# Patient Record
Sex: MALE
[De-identification: names, ages, dates, MRNs, and addresses within clinical notes are randomized per-mention and may not be internally consistent; named-entity substitution may affect disease eponyms.]

---

## 2023-08-16 ENCOUNTER — DASHBOARD ENCOUNTERS (OUTPATIENT)
Age: 48
End: 2023-08-16

## 2023-08-16 ENCOUNTER — OFFICE VISIT (OUTPATIENT)
Dept: URBAN - METROPOLITAN AREA TELEHEALTH 2 | Facility: TELEHEALTH | Age: 48
End: 2023-08-16
Payer: COMMERCIAL

## 2023-08-16 DIAGNOSIS — R10.84 ABDOMINAL CRAMPING, GENERALIZED: ICD-10-CM

## 2023-08-16 DIAGNOSIS — R11.0 NAUSEA: ICD-10-CM

## 2023-08-16 DIAGNOSIS — R19.7 ACUTE DIARRHEA: ICD-10-CM

## 2023-08-16 DIAGNOSIS — Z86.19 HISTORY OF GIARDIA INFECTION: ICD-10-CM

## 2023-08-16 PROCEDURE — 99204 OFFICE O/P NEW MOD 45 MIN: CPT | Performed by: INTERNAL MEDICINE

## 2023-08-16 RX ORDER — CHOLESTYRAMINE 4 G/9G
1 SCOOP POWDER, FOR SUSPENSION ORAL ONCE A DAY
Qty: 30 | OUTPATIENT
Start: 2023-08-16

## 2023-08-16 NOTE — HPI-TODAY'S VISIT:
Mr. Pelaez 48y M presents today w/ complaints of diarrhea, he states he has had intermittent loose stools since 2018, he has a hx of giardia which he was treated for in 2018 and then resurfaced again in fall of 2021, patient states that over the last 5 days, he has developed a low FODMAP diet which has significantly decreased his abdominal cramping, however, he continues to have diarrhea and nausea  (+) Chronic diarrhea  - Patient has had 3 colonoscopies, the last in 2021, and advised to f/u again in 10 years  - Last stool test was in February 23', which was normal  - Patient is currently not taking any medications for his diarrhea  - Patient notes occasional hard stools maybe once a week, patient notes he has complete evacuation most days, but occasionally will experience incomplete evacuation  - On a normal day patient will have 1-2 BM's a day, on a bad day he will have 6-7 BM's a day, patient states this new bout of diarrhea is very watery and then other days he will have mushier stools  - Patient notes mucus in his stool a few days a week based on how many movements he has had that day  - Coffee will usually exacerbate his symptoms and cause him to have more BM's  - Patient has eliminated dairy from his diet and notes good improvement in his abdominal cramping pain  - Patient notes occasionally he will have mild rectal bleeding after wiping continuously and only occurs rarely

## 2023-08-22 ENCOUNTER — TELEPHONE ENCOUNTER (OUTPATIENT)
Dept: URBAN - METROPOLITAN AREA CLINIC 25 | Facility: CLINIC | Age: 48
End: 2023-08-22

## 2023-08-28 ENCOUNTER — WEB ENCOUNTER (OUTPATIENT)
Dept: URBAN - METROPOLITAN AREA CLINIC 25 | Facility: CLINIC | Age: 48
End: 2023-08-28

## 2023-09-01 ENCOUNTER — WEB ENCOUNTER (OUTPATIENT)
Dept: URBAN - METROPOLITAN AREA CLINIC 25 | Facility: CLINIC | Age: 48
End: 2023-09-01

## 2023-09-06 ENCOUNTER — WEB ENCOUNTER (OUTPATIENT)
Dept: URBAN - METROPOLITAN AREA CLINIC 25 | Facility: CLINIC | Age: 48
End: 2023-09-06

## 2023-10-15 ENCOUNTER — WEB ENCOUNTER (OUTPATIENT)
Dept: URBAN - METROPOLITAN AREA CLINIC 25 | Facility: CLINIC | Age: 48
End: 2023-10-15

## 2023-10-20 ENCOUNTER — WEB ENCOUNTER (OUTPATIENT)
Dept: URBAN - METROPOLITAN AREA CLINIC 25 | Facility: CLINIC | Age: 48
End: 2023-10-20

## 2023-10-20 ENCOUNTER — LAB OUTSIDE AN ENCOUNTER (OUTPATIENT)
Dept: URBAN - METROPOLITAN AREA CLINIC 25 | Facility: CLINIC | Age: 48
End: 2023-10-20

## 2023-10-24 ENCOUNTER — WEB ENCOUNTER (OUTPATIENT)
Dept: URBAN - METROPOLITAN AREA CLINIC 25 | Facility: CLINIC | Age: 48
End: 2023-10-24

## 2023-10-25 ENCOUNTER — WEB ENCOUNTER (OUTPATIENT)
Dept: URBAN - METROPOLITAN AREA CLINIC 25 | Facility: CLINIC | Age: 48
End: 2023-10-25

## 2023-10-26 ENCOUNTER — WEB ENCOUNTER (OUTPATIENT)
Dept: URBAN - METROPOLITAN AREA CLINIC 25 | Facility: CLINIC | Age: 48
End: 2023-10-26

## 2023-10-28 LAB
ADENOVIRUS F 40/41: NOT DETECTED
CAMPYLOBACTER: NOT DETECTED
CLOSTRIDIUM DIFFICILE: NOT DETECTED
ENTAMOEBA HISTOLYTICA: NOT DETECTED
ENTEROAGGREGATIVE E.COLI: NOT DETECTED
ENTEROTOXIGENIC E.COLI: NOT DETECTED
ESCHERICHIA COLI O157: NOT DETECTED
GIARDIA LAMBLIA: NOT DETECTED
NOROVIRUS GI/GII: NOT DETECTED
ROTAVIRUS A: NOT DETECTED
SALMONELLA SPP.: NOT DETECTED
SHIGA-LIKE TOXIN PRODUCING E.COLI: NOT DETECTED
SHIGELLA SPP. / ENTEROINVASIVE E.COLI: NOT DETECTED
VIBRIO PARAHAEMOLYTICUS: NOT DETECTED
VIBRIO SPP.: NOT DETECTED
YERSINIA ENTEROCOLITICA: NOT DETECTED

## 2023-11-08 ENCOUNTER — WEB ENCOUNTER (OUTPATIENT)
Dept: URBAN - METROPOLITAN AREA CLINIC 25 | Facility: CLINIC | Age: 48
End: 2023-11-08

## 2023-11-09 ENCOUNTER — WEB ENCOUNTER (OUTPATIENT)
Dept: URBAN - METROPOLITAN AREA CLINIC 25 | Facility: CLINIC | Age: 48
End: 2023-11-09